# Patient Record
Sex: FEMALE | Race: BLACK OR AFRICAN AMERICAN | NOT HISPANIC OR LATINO | URBAN - METROPOLITAN AREA
[De-identification: names, ages, dates, MRNs, and addresses within clinical notes are randomized per-mention and may not be internally consistent; named-entity substitution may affect disease eponyms.]

---

## 2019-06-03 PROBLEM — Z00.00 ENCOUNTER FOR PREVENTIVE HEALTH EXAMINATION: Status: ACTIVE | Noted: 2019-06-03

## 2019-06-04 ENCOUNTER — OUTPATIENT (OUTPATIENT)
Dept: OUTPATIENT SERVICES | Facility: HOSPITAL | Age: 44
LOS: 1 days | End: 2019-06-04
Payer: COMMERCIAL

## 2019-06-04 ENCOUNTER — APPOINTMENT (OUTPATIENT)
Dept: MAMMOGRAPHY | Facility: CLINIC | Age: 44
End: 2019-06-04
Payer: COMMERCIAL

## 2019-06-04 ENCOUNTER — APPOINTMENT (OUTPATIENT)
Dept: ULTRASOUND IMAGING | Facility: CLINIC | Age: 44
End: 2019-06-04
Payer: COMMERCIAL

## 2019-06-04 DIAGNOSIS — Z00.8 ENCOUNTER FOR OTHER GENERAL EXAMINATION: ICD-10-CM

## 2019-06-04 PROCEDURE — 76641 ULTRASOUND BREAST COMPLETE: CPT | Mod: 26,50

## 2019-06-04 PROCEDURE — 77066 DX MAMMO INCL CAD BI: CPT | Mod: 26

## 2019-06-04 PROCEDURE — 76641 ULTRASOUND BREAST COMPLETE: CPT

## 2019-06-04 PROCEDURE — G0279: CPT

## 2019-06-04 PROCEDURE — G0279: CPT | Mod: 26

## 2019-06-04 PROCEDURE — 77066 DX MAMMO INCL CAD BI: CPT

## 2019-06-14 ENCOUNTER — APPOINTMENT (OUTPATIENT)
Dept: ULTRASOUND IMAGING | Facility: IMAGING CENTER | Age: 44
End: 2019-06-14
Payer: COMMERCIAL

## 2019-06-14 ENCOUNTER — OUTPATIENT (OUTPATIENT)
Dept: OUTPATIENT SERVICES | Facility: HOSPITAL | Age: 44
LOS: 1 days | End: 2019-06-14
Payer: COMMERCIAL

## 2019-06-14 DIAGNOSIS — Z00.8 ENCOUNTER FOR OTHER GENERAL EXAMINATION: ICD-10-CM

## 2019-06-14 PROCEDURE — 76642 ULTRASOUND BREAST LIMITED: CPT

## 2019-06-14 PROCEDURE — 76642 ULTRASOUND BREAST LIMITED: CPT | Mod: 26,LT

## 2019-07-08 ENCOUNTER — RESULT REVIEW (OUTPATIENT)
Age: 44
End: 2019-07-08

## 2019-07-15 ENCOUNTER — APPOINTMENT (OUTPATIENT)
Dept: ULTRASOUND IMAGING | Facility: IMAGING CENTER | Age: 44
End: 2019-07-15
Payer: COMMERCIAL

## 2019-07-15 ENCOUNTER — OUTPATIENT (OUTPATIENT)
Dept: OUTPATIENT SERVICES | Facility: HOSPITAL | Age: 44
LOS: 1 days | End: 2019-07-15
Payer: COMMERCIAL

## 2019-07-15 DIAGNOSIS — Z00.8 ENCOUNTER FOR OTHER GENERAL EXAMINATION: ICD-10-CM

## 2019-07-15 PROCEDURE — 76830 TRANSVAGINAL US NON-OB: CPT

## 2019-07-15 PROCEDURE — 76830 TRANSVAGINAL US NON-OB: CPT | Mod: 26

## 2019-09-27 ENCOUNTER — EMERGENCY (EMERGENCY)
Facility: HOSPITAL | Age: 44
LOS: 0 days | Discharge: HOME | End: 2019-09-27
Payer: COMMERCIAL

## 2019-09-27 VITALS
HEIGHT: 62 IN | WEIGHT: 139.99 LBS | DIASTOLIC BLOOD PRESSURE: 65 MMHG | SYSTOLIC BLOOD PRESSURE: 107 MMHG | OXYGEN SATURATION: 99 % | TEMPERATURE: 99 F | RESPIRATION RATE: 18 BRPM | HEART RATE: 73 BPM

## 2019-09-27 VITALS
RESPIRATION RATE: 20 BRPM | SYSTOLIC BLOOD PRESSURE: 103 MMHG | HEART RATE: 66 BPM | OXYGEN SATURATION: 98 % | TEMPERATURE: 98 F | HEIGHT: 63 IN | DIASTOLIC BLOOD PRESSURE: 65 MMHG | WEIGHT: 139.99 LBS

## 2019-09-27 DIAGNOSIS — Z91.013 ALLERGY TO SEAFOOD: ICD-10-CM

## 2019-09-27 DIAGNOSIS — T78.40XA ALLERGY, UNSPECIFIED, INITIAL ENCOUNTER: ICD-10-CM
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
## 2019-09-27 DIAGNOSIS — B34.9 VIRAL INFECTION, UNSPECIFIED: ICD-10-CM

## 2019-09-27 DIAGNOSIS — J11.1 INFLUENZA DUE TO UNIDENTIFIED INFLUENZA VIRUS WITH OTHER RESPIRATORY MANIFESTATIONS: ICD-10-CM
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
## 2019-09-27 DIAGNOSIS — R60.9 EDEMA, UNSPECIFIED: ICD-10-CM

## 2019-09-27 PROCEDURE — 99283 EMERGENCY DEPT VISIT LOW MDM: CPT

## 2019-09-27 PROCEDURE — 71046 X-RAY EXAM CHEST 2 VIEWS: CPT | Mod: 26

## 2019-09-27 PROCEDURE — 99283 EMERGENCY DEPT VISIT LOW MDM: CPT | Mod: 25

## 2019-09-27 RX ORDER — DIPHENHYDRAMINE HCL 50 MG
1 CAPSULE ORAL
Qty: 12 | Refills: 0
Start: 2019-09-27 | End: 2019-09-29

## 2019-09-27 RX ORDER — FAMOTIDINE 10 MG/ML
20 INJECTION INTRAVENOUS ONCE
Refills: 0 | Status: COMPLETED | OUTPATIENT
Start: 2019-09-27 | End: 2019-09-27

## 2019-09-27 RX ORDER — FAMOTIDINE 10 MG/ML
1 INJECTION INTRAVENOUS
Qty: 4 | Refills: 0
Start: 2019-09-27 | End: 2019-09-30

## 2019-09-27 RX ORDER — IBUPROFEN 200 MG
600 TABLET ORAL ONCE
Refills: 0 | Status: COMPLETED | OUTPATIENT
Start: 2019-09-27 | End: 2019-09-27

## 2019-09-27 RX ADMIN — Medication 60 MILLIGRAM(S): at 16:19

## 2019-09-27 RX ADMIN — FAMOTIDINE 20 MILLIGRAM(S): 10 INJECTION INTRAVENOUS at 16:19

## 2019-09-27 RX ADMIN — Medication 600 MILLIGRAM(S): at 13:28

## 2019-09-27 NOTE — ED PROVIDER NOTE - CLINICAL SUMMARY MEDICAL DECISION MAKING FREE TEXT BOX
Allergic reaction, likely to Ibuprofen, has been about 3 hours since Dose, will observe, treat with prednisone and pepcid. Patient declining Benadryl as she wants to drive home. Anticipate dc with prednisone burst, benadryl, Pepcid and outpt f/u

## 2019-09-27 NOTE — ED PROVIDER NOTE - EYES, MLM
Clear bilaterally, pupils equal, round and reactive to light. Mild maxime orbital swelling bilaterally.

## 2019-09-27 NOTE — ED PROVIDER NOTE - PATIENT PORTAL LINK FT
You can access the FollowMyHealth Patient Portal offered by [facility] by registering at the following website: [email or web address]. By joining [employer]’s FollowMyHealth portal, you will also be able to view your health information using other applications (apps) compatible with our system.

## 2019-09-27 NOTE — ED PROVIDER NOTE - OBJECTIVE STATEMENT
44F here with body aches, nonproductive cough, subjective fever x 2 days. No nausea, vomiting or diarrhea. Denies sick contacts. Reports feeling cold but no chills. No rash. No flu shot this year.

## 2019-09-27 NOTE — ED PROVIDER NOTE - PATIENT PORTAL LINK FT
You can access the FollowMyHealth Patient Portal offered by [facility] by registering at the following website: [email or web address]. By joining [employer]’s FollowMyHealth portal, you will also be able to view your health information using other applications (apps) compatible with our system.

## 2019-09-27 NOTE — ED PROVIDER NOTE - ENMT, MLM
Airway patent, Nasal mucosa clear. Mouth with normal mucosa. Throat has no vesicles, no oropharyngeal exudates and uvula is midline. No edema of the lips or tongue.

## 2019-09-27 NOTE — ED PROVIDER NOTE - CLINICAL SUMMARY MEDICAL DECISION MAKING FREE TEXT BOX
Patient presents with body aches, cough, subjective fever. Temp 99.2 here. Plan for CXR to evaluate for pneumonia. Will start tamiflu given conern for influenza, recommend rest, hydration, nsaids, outpt f/u, reviewed reasons to return to the ED

## 2019-09-27 NOTE — ED PROVIDER NOTE - OBJECTIVE STATEMENT
44F here with allergic reaction. She presented to the ED earlier today with flu like symptoms, was given a dose of Ibuprofen and sent Rx for Tamiflu. She did not fill the Tamiflu yet. She noted swelling around her eyes over the hour prior to return to the ER. She has never had NSAIDs in the past. She has no other new medications, food or exposures. Denies tongue, lip or throat swelling. Denies shortness of breath. No rash or itching.

## 2019-10-07 ENCOUNTER — TRANSCRIPTION ENCOUNTER (OUTPATIENT)
Age: 44
End: 2019-10-07

## 2020-02-09 NOTE — ED PROVIDER NOTE - CPE EDP ENMT NORM
"[facility] Progress Note        Antibiotics:  Anti-Infectives     Ordered     Dose/Rate Route Frequency Start Stop    07/24/18 1215  DAPTOmycin (CUBICIN) 450 mg in sodium chloride 0.9 % 50 mL IVPB     Ordering Provider:  Richard Andino MD    6 mg/kg × 78.5 kg  100 mL/hr over 30 Minutes Intravenous Every 24 Hours 07/24/18 1315 08/07/18 1329    07/24/18 1059  vancomycin 1250 mg/250 mL 0.9% NS IVPB ([facility])     Ordering Provider:  Roz Mendoza RPH    1,250 mg  over 90 Minutes Intravenous Once 07/24/18 1200 07/24/18 1359    07/23/18 1802  cefTRIAXone (ROCEPHIN) IVPB 1 g     Ordering Provider:  ANA MARÍA Angeles    1 g  100 mL/hr over 30 Minutes Intravenous Every 24 Hours 07/23/18 2000 07/28/18 1959    07/23/18 1812  vancomycin 2000 mg/500 mL 0.9% NS IVPB ([facility])     Ordering Provider:  Saba Javier RPH    25 mg/kg × 78.5 kg  over 120 Minutes Intravenous Once 07/23/18 2000 07/24/18 0210          CC: right septic bursitis    HPI:  Patient is a 75 y.o.  Yr old male with history of ulcerative colitis per past records in addition to prior stroke, stays with his daughter and had trauma to the right elbow with scrape/bump near the olecrenon bursa 3 weeks prior to his July admission.  2 weeks prior to his July admission, he had increased redness posterior right elbow and upper/lower arm, was seen at [facility] outpatient office and prescribed empiric doxycycline.  He later saw his PCP, Dr. Saba Arrington and received at least 1 \"shot\" antibiotics in addition to continue doxycycline but didn't make significant improvements according to daughters.  He was admitted July 23 with persistent redness/swelling.     7/25/18 He has stable pain in the posterior right elbow and upper/forearm that is dull, constant, nonradiating, worse with palpation, better with pain meds and not associated with any active drainage. He does not have any specific restriction in elbow range of motion. No F/C/S; no myalgia/SOB    ROS:      7/25/18 " "No f/c/s. No n/v/d. No rash. No new ADR to Abx.     PE:   /76 (BP Location: Right arm, Patient Position: Lying)   Pulse 81   Temp 97.9 °F (36.6 °C) (Oral)   Resp 16   Ht 170.2 cm (67\")   Wt 78.5 kg (173 lb 1 oz)   SpO2 94%   BMI 27.11 kg/m²     GENERAL: Awake and alert, in no acute distress.   HEENT: Normocephalic, atraumatic.  PERRL. EOMI. No conjunctival injection. No icterus. Oropharynx clear without evidence of thrush or exudate. No evidence of peridontal disease.    NECK: Supple without nuchal rigidity. No mass.  LYMPH: No cervical, axillary or inguinal lymphadenopathy.  HEART: RRR; No murmur, rubs, gallops.   LUNGS: Clear to auscultation bilaterally without wheezing, rales, rhonchi. Normal respiratory effort. Nonlabored. No dullness.  ABDOMEN: Soft, nontender, nondistended. Positive bowel sounds. No rebound or guarding. NO mass or HSM.  EXT:  No cyanosis, clubbing or edema. No cord.  : Genitalia generally unremarkable.  Without Ortega catheter.  MSK: FROM without joint effusions noted arms/legs.    SKIN: Warm and dry without cutaneous eruptions on Inspection/palpation aside from below.    NEURO: Oriented to PPT.  Right side generally weaker than left in general, he has difficult time cooperating with a detailed motor exam with positioning in bed.     Right posterior arm with vague redness/induration and warmth/tenderness.  He has light scale at the posterior elbow/olecrenon bursa but no open wound or active drainage.  Vague erythema extends to the upper arm and forearm but is receding.  No discrete mass bulge or fluctuance.  No crepitus or bulla.  No definitive ballotable fluid at the olecrenon bursa;  Skin is thickened     No peripheral stigmata/phenomena of endocarditis    Laboratory Data      Results from last 7 days  Lab Units 07/24/18  0458   WBC 10*3/mm3 7.97   HEMOGLOBIN g/dL 14.0   HEMATOCRIT % 43.2   PLATELETS 10*3/mm3 195       Results from last 7 days  Lab Units 07/24/18  0458   SODIUM " mmol/L 138   POTASSIUM mmol/L 3.7   CHLORIDE mmol/L 105   CO2 mmol/L 24.0   BUN mg/dL 9   CREATININE mg/dL 0.60   GLUCOSE mg/dL 106*   CALCIUM mg/dL 9.3                   Estimated Creatinine Clearance: 88.6 mL/min (by C-G formula based on SCr of 0.6 mg/dL).      Microbiology:      Radiology:  Imaging Results (last 72 hours)     ** No results found for the last 72 hours. **            Impression:     --Acute right upper extremity septic olecrenon bursitis; superficial trauma 3 weeks prior to admission, subsequent soft tissue infection and not responding to empiric doxycycline.  Dominant pathogen in most cases is staph species.  Broad coverage ongoing with daptomycin/Rocephin.  If significant ballotable fluid evolves, orthopedics would need to give consideration to aspiration or debridement; this would help with both microbiologic diagnosis and therapy if it evolves.  In addition, if not steadily better with current antibiotics, surgical intervention should be considered for both therapeutic/diagnostic purposes to help rule out foreign body or other atypical microbiologic diagnosis.  Patient/family voice understanding and complexity and the potential for further functional loss/chronic pain or recurrent/progressive infection etc.; no evidence for foreign body by exam.      --Subacute trauma right elbow as above     --History stroke with residual right-sided weakness with chronic debility    PLAN:   --IV daptomycin/Rocephin     --Check/review labs cultures and scans     --Discussed with microbiology     --Partial history per nursing staff and family     --Discussed with patient's daughter in detail     --Discussed with Dr. PRADO     --Highly complex set of issues with high risk for further serious morbidity and other serious sequela    --If discharged today, then he should come to our office starting Thursday, July 26 at 9:30 AM for ongoing daptomycin 500 mg IV daily, Rocephin 1 g IV daily and follow-up with me July  26    Richard Andino MD  7/25/2018         5 normal...

## 2020-04-06 ENCOUNTER — RESULT REVIEW (OUTPATIENT)
Age: 45
End: 2020-04-06

## 2020-04-26 ENCOUNTER — MESSAGE (OUTPATIENT)
Age: 45
End: 2020-04-26

## 2022-12-06 NOTE — ED ADULT NURSE NOTE - ISOLATION TYPE:
Problem: Occupational Therapy  Goal: Occupational Therapy Goal  Description: Goals to be met by: 3 weeks     Patient will increase functional independence with ADLs by performing:    UE Dressing with Supervision.  LE Dressing with Supervision.  Grooming while seated at sink with Set-up Assistance.  Toileting from toilet with Stand-by Assistance for hygiene and clothing management.   Bathing sitting at sink with Stand-by Assistance.  Supine to sit with Modified [place].  Step transfer with Supervision with appropriate A/D  Upper extremity exercise with supervision.  Caregiver will be educated on level of assist required to safely perform self care tasks and functional transfers..   Outcome: Ongoing, Progressing      None

## 2024-06-17 ENCOUNTER — EMERGENCY (EMERGENCY)
Facility: HOSPITAL | Age: 49
LOS: 0 days | Discharge: HOME | End: 2024-06-17
Attending: STUDENT IN AN ORGANIZED HEALTH CARE EDUCATION/TRAINING PROGRAM
Payer: COMMERCIAL

## 2024-06-17 VITALS
OXYGEN SATURATION: 99 % | DIASTOLIC BLOOD PRESSURE: 84 MMHG | TEMPERATURE: 98 F | HEART RATE: 65 BPM | RESPIRATION RATE: 19 BRPM | SYSTOLIC BLOOD PRESSURE: 133 MMHG | HEIGHT: 61 IN | WEIGHT: 115.08 LBS

## 2024-06-17 DIAGNOSIS — Z88.6 ALLERGY STATUS TO ANALGESIC AGENT: ICD-10-CM

## 2024-06-17 DIAGNOSIS — M79.18 MYALGIA, OTHER SITE: ICD-10-CM

## 2024-06-17 DIAGNOSIS — Z91.013 ALLERGY TO SEAFOOD: ICD-10-CM

## 2024-06-17 DIAGNOSIS — R51.9 HEADACHE, UNSPECIFIED: ICD-10-CM

## 2024-06-17 DIAGNOSIS — M79.662 PAIN IN LEFT LOWER LEG: ICD-10-CM
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
## 2024-06-17 DIAGNOSIS — M79.661 PAIN IN RIGHT LOWER LEG: ICD-10-CM

## 2024-06-17 PROCEDURE — 73590 X-RAY EXAM OF LOWER LEG: CPT | Mod: 26,50

## 2024-06-17 PROCEDURE — 99284 EMERGENCY DEPT VISIT MOD MDM: CPT

## 2024-06-17 RX ORDER — ACETAMINOPHEN 500 MG
975 TABLET ORAL ONCE
Refills: 0 | Status: COMPLETED | OUTPATIENT
Start: 2024-06-17 | End: 2024-06-17

## 2024-06-17 RX ADMIN — Medication 975 MILLIGRAM(S): at 13:27

## 2024-06-17 RX ADMIN — Medication 975 MILLIGRAM(S): at 12:57
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
## 2024-06-17 NOTE — ED ADULT NURSE NOTE - NSFALLUNIVINTERV_ED_ALL_ED
Bed/Stretcher in lowest position, wheels locked, appropriate side rails in place/Call bell, personal items and telephone in reach/Instruct patient to call for assistance before getting out of bed/chair/stretcher/Non-slip footwear applied when patient is off stretcher/[place] to call system/Physically safe environment - no spills, clutter or unnecessary equipment/Purposeful proactive rounding/Room/bathroom lighting operational, light cord in reach
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
## 2024-06-17 NOTE — ED PROVIDER NOTE - PHYSICAL EXAMINATION
CONSTITUTIONAL: Appears well, in no apparent distress  HEAD: Normocephalic, no obvious signs of trauma  EENT: PERRL, EOMI w/o pain, nares patent no drainage, no pharyngeal erythema, swelling, or exudates  NECK: Trachea midline, no goiter  RESP: L/S equal clr, bilat, apices and bases, no accessory muscle use, speaking full sentences  CARDIC: RRR, +S1/S2, no peripheral edema  GI: ABD soft, nondistended, nontender on palpation, no palpable masses, negative Malhotra's Sign, no seatbelt sign   : No CVA Tenderness  MSK: 5/5 strength extremities x 4, full ROM without pain, no spinal tenderness on palpation, ecchymosis/swelling/tenderness bilat tibia, no deformity/crepitus, cap refill<2 seconds, pedal pulse +2 bilat   NEURO: A&OX4, No focal motor deficits/weakness, no sensory deficits, no slurred speech, no facial droop, normal gait

## 2024-06-17 NOTE — ED PROVIDER NOTE - PROGRESS NOTE DETAILS
ANEESH Solomon NP: Mary Lou neg, will DC with outpatient follow up. Patient updated on results and is agreeable to plan. Questions answered, patient verbalizes understanding. Return precautions given.

## 2024-06-17 NOTE — ED PROVIDER NOTE - PATIENT PORTAL LINK FT
You can access the FollowMyHealth Patient Portal offered by [facility] by registering at the following website: [email or web address]. By joining [email or web address]’s FollowMyHealth portal, you will also be able to view your health information using other applications (apps) compatible with our system.
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
